# Patient Record
Sex: MALE | Race: WHITE | NOT HISPANIC OR LATINO | Employment: FULL TIME | URBAN - METROPOLITAN AREA
[De-identification: names, ages, dates, MRNs, and addresses within clinical notes are randomized per-mention and may not be internally consistent; named-entity substitution may affect disease eponyms.]

---

## 2017-02-15 ENCOUNTER — HOSPITAL ENCOUNTER (EMERGENCY)
Facility: HOSPITAL | Age: 47
Discharge: HOME | End: 2017-02-15
Payer: COMMERCIAL

## 2017-02-15 ENCOUNTER — APPOINTMENT (EMERGENCY)
Dept: RADIOLOGY | Facility: HOSPITAL | Age: 47
End: 2017-02-15
Payer: COMMERCIAL

## 2017-02-15 VITALS
HEIGHT: 70 IN | SYSTOLIC BLOOD PRESSURE: 138 MMHG | OXYGEN SATURATION: 98 % | TEMPERATURE: 96.9 F | RESPIRATION RATE: 20 BRPM | WEIGHT: 210 LBS | BODY MASS INDEX: 30.06 KG/M2 | HEART RATE: 98 BPM | DIASTOLIC BLOOD PRESSURE: 88 MMHG

## 2017-02-15 DIAGNOSIS — S62.633A FRACTURE OF DISTAL PHALANX OF LEFT MIDDLE FINGER: ICD-10-CM

## 2017-02-15 DIAGNOSIS — S61.313A LACERATION OF LEFT MIDDLE FINGER W/O FOREIGN BODY WITH DAMAGE TO NAIL, INITIAL ENCOUNTER: Primary | ICD-10-CM

## 2017-02-15 PROCEDURE — 73130 X-RAY EXAM OF HAND: CPT

## 2017-02-15 PROCEDURE — 99283 EMERGENCY DEPT VISIT LOW MDM: CPT

## 2017-02-15 RX ORDER — LANSOPRAZOLE 30 MG/1
30 CAPSULE, DELAYED RELEASE ORAL DAILY
COMMUNITY

## 2017-02-15 RX ORDER — LIDOCAINE HYDROCHLORIDE 10 MG/ML
10 INJECTION, SOLUTION EPIDURAL; INFILTRATION; INTRACAUDAL; PERINEURAL ONCE
Status: DISCONTINUED | OUTPATIENT
Start: 2017-02-15 | End: 2017-02-15 | Stop reason: HOSPADM

## 2017-02-15 RX ORDER — CEPHALEXIN 500 MG/1
500 CAPSULE ORAL 4 TIMES DAILY
Qty: 40 CAPSULE | Refills: 0 | Status: SHIPPED | OUTPATIENT
Start: 2017-02-15 | End: 2017-02-25

## 2017-02-15 RX ORDER — HYDROCODONE BITARTRATE AND ACETAMINOPHEN 5; 325 MG/1; MG/1
1 TABLET ORAL EVERY 6 HOURS PRN
Qty: 20 TABLET | Refills: 0 | Status: SHIPPED | OUTPATIENT
Start: 2017-02-15 | End: 2017-02-25

## 2020-02-28 ENCOUNTER — HOSPITAL ENCOUNTER (EMERGENCY)
Facility: HOSPITAL | Age: 50
Discharge: HOME | End: 2020-02-28
Attending: EMERGENCY MEDICINE | Admitting: EMERGENCY MEDICINE
Payer: COMMERCIAL

## 2020-02-28 ENCOUNTER — APPOINTMENT (EMERGENCY)
Dept: RADIOLOGY | Facility: HOSPITAL | Age: 50
End: 2020-02-28
Payer: COMMERCIAL

## 2020-02-28 VITALS
WEIGHT: 189 LBS | HEART RATE: 98 BPM | SYSTOLIC BLOOD PRESSURE: 159 MMHG | BODY MASS INDEX: 27.12 KG/M2 | TEMPERATURE: 98.2 F | OXYGEN SATURATION: 99 % | RESPIRATION RATE: 20 BRPM | DIASTOLIC BLOOD PRESSURE: 100 MMHG

## 2020-02-28 DIAGNOSIS — S42.022A DISPLACED FRACTURE OF SHAFT OF LEFT CLAVICLE, INITIAL ENCOUNTER FOR CLOSED FRACTURE: Primary | ICD-10-CM

## 2020-02-28 PROCEDURE — 99283 EMERGENCY DEPT VISIT LOW MDM: CPT

## 2020-02-28 PROCEDURE — 73030 X-RAY EXAM OF SHOULDER: CPT

## 2020-02-28 PROCEDURE — 99284 EMERGENCY DEPT VISIT MOD MDM: CPT | Performed by: PHYSICIAN ASSISTANT

## 2020-02-28 RX ORDER — ACETAMINOPHEN 325 MG/1
650 TABLET ORAL ONCE
Status: DISCONTINUED | OUTPATIENT
Start: 2020-02-28 | End: 2020-02-28 | Stop reason: HOSPADM

## 2020-02-28 RX ORDER — IBUPROFEN 600 MG/1
600 TABLET ORAL ONCE
Status: DISCONTINUED | OUTPATIENT
Start: 2020-02-28 | End: 2020-02-28 | Stop reason: HOSPADM

## 2020-02-28 NOTE — ED PROVIDER NOTES
History  Chief Complaint   Patient presents with    Shoulder Injury     To ED with c/o left shoulder injury  Patient fell approx one hour ago while skiiing  Denies any other injuries  72-year-old male with history of GERD and hypertension presents emergency department for evaluation of left shoulder injury  He fell while skiing, landing an abducted left arm  Patient has obvious deformity to the left anterior shoulder/clavicle  He is able to move the arm with severe pain  Denies distal paresthesias  There are no open wounds  Prior to Admission Medications   Prescriptions Last Dose Informant Patient Reported? Taking?   lansoprazole (PREVACID) 30 mg capsule   Yes No   Sig: Take 30 mg by mouth daily      Facility-Administered Medications: None       Past Medical History:   Diagnosis Date    GERD (gastroesophageal reflux disease)     Hypertension        Past Surgical History:   Procedure Laterality Date    COLON SURGERY         History reviewed  No pertinent family history  I have reviewed and agree with the history as documented  E-Cigarette/Vaping    E-Cigarette Use Never User      E-Cigarette/Vaping Substances     Social History     Tobacco Use    Smoking status: Never Smoker    Smokeless tobacco: Never Used   Substance Use Topics    Alcohol use: Yes     Comment: couple times/week    Drug use: No       Review of Systems   Constitutional: Negative for fever  Musculoskeletal: Positive for arthralgias  Negative for joint swelling and myalgias  Skin: Negative for color change and wound  Neurological: Negative for weakness and numbness  All other systems reviewed and are negative  Physical Exam  Physical Exam   Constitutional: He is oriented to person, place, and time  He appears well-developed and well-nourished  No distress  HENT:   Head: Normocephalic and atraumatic  Mouth/Throat: Oropharynx is clear and moist    Eyes: Pupils are equal, round, and reactive to light   No scleral icterus  Neck: No JVD present  Cardiovascular: Normal rate and regular rhythm  Exam reveals no gallop and no friction rub  No murmur heard  Pulmonary/Chest: No respiratory distress  He has no wheezes  He has no rales  Musculoskeletal:        Left shoulder: He exhibits decreased range of motion, swelling and deformity  Focal ecchymosis and prominence to the side of the fracture  See clinical images  Distal neurovascular status is intact   Neurological: He is alert and oriented to person, place, and time  Skin: Skin is warm and dry  Capillary refill takes less than 2 seconds  He is not diaphoretic  Psychiatric: He has a normal mood and affect  His behavior is normal    Vitals reviewed  Vital Signs  ED Triage Vitals [02/28/20 1658]   Temperature Pulse Respirations Blood Pressure SpO2   98 2 °F (36 8 °C) 98 20 159/100 99 %      Temp Source Heart Rate Source Patient Position - Orthostatic VS BP Location FiO2 (%)   Tympanic Monitor Sitting Right arm --      Pain Score       Worst Possible Pain           Vitals:    02/28/20 1658   BP: 159/100   Pulse: 98   Patient Position - Orthostatic VS: Sitting         Visual Acuity      ED Medications  Medications   ibuprofen (MOTRIN) tablet 600 mg (600 mg Oral Not Given 2/28/20 1747)   acetaminophen (TYLENOL) tablet 650 mg (650 mg Oral Not Given 2/28/20 1747)       Diagnostic Studies  Results Reviewed     None                 XR shoulder 2+ views LEFT   ED Interpretation by Helen Diaz PA-C (02/28 1740)   Displaced, shortened midshaft clavicle fracture                 Procedures  Procedures         ED Course                               MDM  Number of Diagnoses or Management Options  Displaced fracture of shaft of left clavicle, initial encounter for closed fracture: new and requires workup  Diagnosis management comments: Clinical and radiologic images reviewed with orthopedics on-call Dr Amy Kaopor    He does not feel there is significant concern for open fracture  Recommend sling outpatient ortho follow-up  The patient was able to schedule follow-up at 2:30 pm with his personal orthopedist        Amount and/or Complexity of Data Reviewed  Tests in the radiology section of CPT®: ordered and reviewed  Review and summarize past medical records: yes  Independent visualization of images, tracings, or specimens: yes          Disposition  Final diagnoses:   Displaced fracture of shaft of left clavicle, initial encounter for closed fracture     Time reflects when diagnosis was documented in both MDM as applicable and the Disposition within this note     Time User Action Codes Description Comment    2/28/2020  5:54 PM Evangelina Roque Add [S42 022A] Displaced fracture of shaft of left clavicle, initial encounter for closed fracture       ED Disposition     ED Disposition Condition Date/Time Comment    Discharge Stable Fri Feb 28, 2020  5:53 PM Cem Garcia discharge to home/self care  Follow-up Information     Follow up With Specialties Details Why Contact Info    Your Orthopedist              Discharge Medication List as of 2/28/2020  5:54 PM      CONTINUE these medications which have NOT CHANGED    Details   lansoprazole (PREVACID) 30 mg capsule Take 30 mg by mouth daily, Until Discontinued, Historical Med           No discharge procedures on file      PDMP Review     None          ED Provider  Electronically Signed by           Marc Ramirez PA-C  02/28/20 6527

## 2023-01-19 ENCOUNTER — APPOINTMENT (EMERGENCY)
Dept: RADIOLOGY | Facility: HOSPITAL | Age: 53
End: 2023-01-19

## 2023-01-19 ENCOUNTER — HOSPITAL ENCOUNTER (EMERGENCY)
Facility: HOSPITAL | Age: 53
Discharge: HOME | End: 2023-01-19
Attending: EMERGENCY MEDICINE

## 2023-01-19 VITALS
TEMPERATURE: 98 F | BODY MASS INDEX: 26.9 KG/M2 | WEIGHT: 187.5 LBS | OXYGEN SATURATION: 97 % | HEART RATE: 70 BPM | SYSTOLIC BLOOD PRESSURE: 147 MMHG | RESPIRATION RATE: 18 BRPM | DIASTOLIC BLOOD PRESSURE: 97 MMHG

## 2023-01-19 DIAGNOSIS — S42.021A DISPLACED FRACTURE OF SHAFT OF RIGHT CLAVICLE, INITIAL ENCOUNTER FOR CLOSED FRACTURE: Primary | ICD-10-CM
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
## 2023-01-19 RX ORDER — IBUPROFEN 600 MG/1
600 TABLET ORAL ONCE
Status: COMPLETED | OUTPATIENT
Start: 2023-01-19 | End: 2023-01-19

## 2023-01-19 RX ORDER — OXYCODONE HYDROCHLORIDE 5 MG/1
5 TABLET ORAL EVERY 4 HOURS PRN
Qty: 20 TABLET | Refills: 0 | Status: SHIPPED | OUTPATIENT
Start: 2023-01-19 | End: 2023-01-29

## 2023-01-19 RX ORDER — OXYCODONE HYDROCHLORIDE 5 MG/1
5 TABLET ORAL ONCE
Status: COMPLETED | OUTPATIENT
Start: 2023-01-19 | End: 2023-01-19

## 2023-01-19 RX ORDER — ACETAMINOPHEN 325 MG/1
650 TABLET ORAL ONCE
Status: COMPLETED | OUTPATIENT
Start: 2023-01-19 | End: 2023-01-19

## 2023-01-19 RX ADMIN — OXYCODONE HYDROCHLORIDE 5 MG: 5 TABLET ORAL at 19:42

## 2023-01-19 RX ADMIN — ACETAMINOPHEN 650 MG: 325 TABLET ORAL at 18:43

## 2023-01-19 RX ADMIN — IBUPROFEN 600 MG: 600 TABLET, FILM COATED ORAL at 18:43

## 2023-01-19 NOTE — ED PROVIDER NOTES
History  Chief Complaint   Patient presents with   • Fall     Patient complaint of " collarbone pain on right side , I was skiing and fell on that side , denies LOC , no blood thinners"     50year-old right-handed male presents for evaluation of right collarbone pain status post fall when skiing  The patient states that he there is a little jump that he did not see due to the late conditions and fell striking his right shoulder and collarbone on the ground  The patient was helmeted and did not lose consciousness  He has no nausea or vomiting  He has no numbness or tingling  Pain is worse with movement or palpation          Prior to Admission Medications   Prescriptions Last Dose Informant Patient Reported? Taking?   lansoprazole (PREVACID) 30 mg capsule   Yes No   Sig: Take 30 mg by mouth daily      Facility-Administered Medications: None       Past Medical History:   Diagnosis Date   • GERD (gastroesophageal reflux disease)    • Hypertension        Past Surgical History:   Procedure Laterality Date   • COLON SURGERY         History reviewed  No pertinent family history  I have reviewed and agree with the history as documented  E-Cigarette/Vaping   • E-Cigarette Use Never User      E-Cigarette/Vaping Substances     Social History     Tobacco Use   • Smoking status: Never   • Smokeless tobacco: Never   Vaping Use   • Vaping Use: Never used   Substance Use Topics   • Alcohol use: Yes     Comment: couple times/week   • Drug use: No       Review of Systems    Physical Exam  Physical Exam  HENT:      Head: Normocephalic and atraumatic  Right Ear: No hemotympanum  Left Ear: No hemotympanum  Musculoskeletal:      Right shoulder: Deformity ( Medial clavicle) and bony tenderness ( Clavicle) present  Normal pulse           Vital Signs  ED Triage Vitals [01/19/23 1817]   Temperature Pulse Respirations Blood Pressure SpO2   98 °F (36 7 °C) 85 18 (!) 175/107 99 %      Temp src Heart Rate Source Patient Position - Orthostatic VS BP Location FiO2 (%)   -- -- -- -- --      Pain Score       --           Vitals:    01/19/23 1817   BP: (!) 175/107   Pulse: 85         Visual Acuity      ED Medications  Medications   ibuprofen (MOTRIN) tablet 600 mg (has no administration in time range)   acetaminophen (TYLENOL) tablet 650 mg (has no administration in time range)       Diagnostic Studies  Results Reviewed     None                 XR clavicle RIGHT    (Results Pending)   XR shoulder 2+ views RIGHT    (Results Pending)              Procedures  Procedures         ED Course  ED Course as of 01/19/23 1929   Thu Jan 19, 2023 1928 Discussed with Dr Dhruv Pruitt from [facility] who agrees with plan for sling, pain control and outpatient follow up  1928 Patient informed of results  Will call wife for ride so I can order stronger pain medication  Patient to follow up with Dr Toscano at [place]  Images sent electronically to [facility]                                             MDM    Disposition  Final diagnoses:   None     ED Disposition     None      Follow-up Information    None         Patient's Medications   Discharge Prescriptions    No medications on file       No discharge procedures on file      PDMP Review     None          ED Provider  Electronically Signed by Value   SBIRT (22 yo +)    In order to provide better care to our patients, we are screening all of our patients for alcohol and drug use  Would it be okay to ask you these screening questions? Yes Filed at: 01/19/2023 1844   Initial Alcohol Screen: US AUDIT-C     1  How often do you have a drink containing alcohol? 3 Filed at: 01/19/2023 1844   2  How many drinks containing alcohol do you have on a typical day you are drinking? 0 Filed at: 01/19/2023 1844   3a  Male UNDER 65: How often do you have five or more drinks on one occasion? 0 Filed at: 01/19/2023 1844   3b  FEMALE Any Age, or MALE 65+: How often do you have 4 or more drinks on one occassion? 0 Filed at: 01/19/2023 1844   Audit-C Score 3 Filed at: 01/19/2023 1844   ANGELO: How many times in the past year have you    Used an illegal drug or used a prescription medication for non-medical reasons? Never Filed at: 01/19/2023 1844                    Medical Decision Making  Differential diagnosis: Clavicle fracture, shoulder dislocation, shoulder fracture, contusion  The plan is for x-ray to rule out fracture/dislocation  On examination, the patient has intact distal neurovascular status  X-rays reviewed consistent with displaced midshaft right clavicular fracture  The plan is for sling, pain control and outpatient orthopedic follow-up  I discussed the case with orthopedics on-call who agreed with the plan for sling and outpatient follow-up    Displaced fracture of shaft of right clavicle, initial encounter for closed fracture: acute illness or injury  Skiing accident, initial encounter: acute illness or injury  Amount and/or Complexity of Data Reviewed  Radiology: ordered and independent interpretation performed  Risk  OTC drugs  Prescription drug management            Disposition  Final diagnoses:   Displaced fracture of shaft of right clavicle, initial encounter for closed fracture   Skiing accident, initial encounter     Time reflects when diagnosis was documented in both MDM as applicable and the Disposition within this note     Time User Action Codes Description Comment    1/19/2023  7:30 PM Jose Max Add [[id number]] Displaced fracture of shaft of right clavicle, initial encounter for closed fracture     1/19/2023  7:31 PM Jose Max Add [V00 [id number]] Skiing accident, initial encounter       ED Disposition     ED Disposition   Discharge    Condition   Stable    Date/Time   u Jan 19, 2023  7:30 PM    [address] discharge to home/self care                 Follow-up Information     Follow up With Specialties Details Why Contact Info Additional Information    Farhad Milton MD Orthopedic Surgery Schedule an appointment as soon as possible for a visit  For further evaluation [address]  [address] [facility] Orthopedic Surgery   [address] [phone]  [address] [facility], [address] [place] [address] [address]     [address] Emergency Department Emergency Medicine Go to  If symptoms worsen [address] [place], [phone]  [address] [facility], [address] [facility] [address]          Discharge Medication List as of 1/19/2023  7:43 PM      START taking these medications    Details   oxyCODONE (Roxicodone) 5 immediate release tablet Take 1 tablet (5 mg total) by mouth every 4 (four) hours as needed for moderate pain for up to 10 days Max Daily Amount: 30 mg, Starting Thu 1/19/2023, Until Sun 1/29/2023 at 2359, Normal         CONTINUE these medications which have NOT CHANGED    Details   lansoprazole (PREVACID) 30 mg capsule Take 30 mg by mouth daily, Until Discontinued, Historical Med             No discharge procedures on file     PDMP Review       Value Time User    PDMP Reviewed  Yes 1/19/2023  7:38 PM Cyrus Silva DO          ED Provider  Electronically Signed by           Cyrus Silva DO  01/27/23 0877

## 2023-01-20 NOTE — DISCHARGE INSTRUCTIONS
You should take 3 over-the-counter ibuprofen tablets with food every 6 hours as needed for pain    Do not do this for more than 5 days without following up with your primary care provider

## 2023-07-07 ENCOUNTER — HOSPITAL ENCOUNTER (OUTPATIENT)
Dept: RADIOLOGY | Facility: HOSPITAL | Age: 53
End: 2023-07-07
Payer: COMMERCIAL

## 2023-07-07 DIAGNOSIS — M77.40 METATARSALGIA, UNSPECIFIED LATERALITY: ICD-10-CM

## 2023-07-07 PROCEDURE — 73630 X-RAY EXAM OF FOOT: CPT
